# Patient Record
(demographics unavailable — no encounter records)

---

## 2024-10-17 NOTE — ASSESSMENT
[FreeTextEntry1] : Patient is a 26 y/o F w/ PMHx of intractable headaches who came here to establish care.  1) Health Maintenance - deferred flu shot for now - will review vaccine records next visit - needs referral for cervical cancer screening (referred to GYN) - needs referral for breast cancer screening (referred to imaging via [facility])  2) Intractable Headaches - advised to keep headache diary to identify triggers - continue taking Tylenol and Excedrin prn - ordered CTH - referred to Neurology  3) Abdominal Pain 4) Constipation 5) Menstrual Cramps - this could be due to constipation and/or dysmenorrhea/pre-menstrual syndrome - ordered US Abd/Pelvis - ordered dulcolax - will refer to GI if pain persists or worsens  5) Enlarged Lymph Node 6) Breast Mass - palpable, non-tender, non-mobile LN on the L posterior cervical and the R axillary area - palpable breast mass on the R axillary area ([place]) - referred to the [facility] for further imaging and management  7) Sebaceous Cyst - fluctuant mass on the L axilla - referred to Surgery - reassurance  Note: Patient was previously seen by the JOAN last 8/2024. Patient was a bit anxious with regards to her current health conditions. She cried in front of me and appreciated the healthcare that we are providing. Would continue to follow-up on this patient and support her the best that we can.  - Advised proper diet and exercise - follow-up in 4 weeks  Total time spent caring for the patient today was 45 minutes. This includes time spent before the visit reviewing the chart, time spent during visit, and time spent after the visit on documentation, etc.

## 2024-10-17 NOTE — HISTORY OF PRESENT ILLNESS
[FreeTextEntry8] : Patient is a 26 y/o F w/ no significant PMHx who came here due to episodes of generalized headaches. She has intermittent episodes occurring 5-6 months per month. She takes extra-strength Tylenol with no relief. She takes Excedrin which affords temporary relief. She also reports periumbilical pain every month related to her menses. She denies any dizziness, blurring of vision, nausea, vomiting, cough, fever, chest pain, shortness of breath, palpitation, heartburn, diarrhea, dysuria, hematuria, back pain, joint pain, weight loss, loss of appetite. She is concern that her father has a head cancer from the stomach. She was previously seen at the [facility] with labs , AST/ALT 70/49. She was seen at [facility] and was prescribed an unrecalled medication. She tried taking Ibuprofen which only helps with her menstrual cramps.

## 2024-10-17 NOTE — REVIEW OF SYSTEMS
[Abdominal Pain] : abdominal pain [Constipation] : constipation [Headache] : headache [Negative] : Heme/Lymph

## 2024-10-17 NOTE — PHYSICAL EXAM
[Well Developed] : well developed [Well Nourished] : well nourished [Conjunctiva] : the conjunctiva were normal in both eyes [PERRL] : pupils were equal in size, round, and reactive to light [EOM Intact] : extraocular movements were intact [Normal Appearance] : was normal in appearance [Neck Supple] : was supple [Enlarged Diffusely] : was not enlarged [Rate ___] : at [unfilled] breaths per minute [Normal Rhythm/Effort] : normal respiratory rhythm and effort [Clear Bilaterally] : the lungs were clear to auscultation bilaterally [Normal to Percussion] : the lungs were normal to percussion [Normal Rate] : normal [Heart Rate ___] : [unfilled] bpm [Normal S1] : normal S1 [Normal S2] : normal S2 [S3] : no S3 [S4] : no S4 [No Murmur] : no murmurs heard [No Pitting Edema] : no pitting edema present [Right Carotid Bruit] : no bruit heard over the right carotid [Left Carotid Bruit] : no bruit heard over the left carotid [Right Femoral Bruit] : no bruit heard over the right femoral artery [Left Femoral Bruit] : no bruit heard over the left femoral artery [2+] : left 2+ [No Abnormalities] : the abdominal aorta was not enlarged and no bruit was heard [Examination Of The Breasts] : a normal appearance [___cm] : a ~M [unfilled] ~Ucm superior lateral quadrant mass was palpated [Soft, Nontender] : the abdomen was soft and nontender [Diffuse] : there was tenderness diffusely on palpation [No Mass] : no masses were palpated [No HSM] : no hepatosplenomegaly noted [Postauricular Lymph Nodes Enlarged On The Left] : enlarged node(s) on the left [Axillary Lymph Nodes Enlarged On The Right] : enlarged node(s) on the right [Normal Kyphosis] : normal kyphosis [No Visual Abnormalities] : no visible abnormalities [Normal Lordosis] : normal lordosis [No Scoliosis] : no scoliosis [No Tenderness to Palpation] : no spine tenderness on palpation [No Masses] : no masses [Full ROM] : full ROM [No Pain with ROM] : no pain with motion in any direction [Intact] : all reflexes within normal limits bilaterally [Normal Station and Gait] : the gait and station were normal [Normal Motor Tone] : the muscle tone was normal [Involuntary Movements] : no involuntary movements were seen [Abnormal Color] : normal color and pigmentation [Skin Lesions 1] : no skin lesions were observed [Skin Turgor Decreased] : normal skin turgor [Normal] : the deep tendon reflexes were normal [Normal Mental Status] : the patient's orientation, memory, attention, language and fund of knowledge were normal [Appropriate] : appropriate [Impaired judgment] : intact judgment [Impaired Insight] : intact insight [de-identified] : (+) tongue normal, (+) good dentition [de-identified] : (+) palpable 1x1 cm mass/ LN at the superior-lateral side of the R breast around the axillary area [de-identified] : c/o GYN [de-identified] : (+) 3x3 cm fluctuant mass on the left axilla

## 2024-10-24 NOTE — PLAN
[FreeTextEntry1] : all of the options risks and benefits to excision of ectopic breast tissue to L axilla pt wants to take some time to think about it and will call back if she wants to proceed w the planned procedure   RTO PRN Patient's questions and concerns addressed to their satisfaction, and patient verbalized an understanding of the information discussed.

## 2024-10-24 NOTE — HISTORY OF PRESENT ILLNESS
[de-identified] : Ms. VENTURA is a 27 year y/o F referred by PCP, Dr. Freeman- for evaluation of lump that she feels to axillary region, to both sides. Pt states that she's felt the lumpy areas for about a few years.  She had BL BREAST US done 10/23/24- no suspicious findings, accessory glandular tissue to palpable region L axilla. BIRADS 2.

## 2024-10-24 NOTE — CONSULT LETTER
[Dear  ___] : Dear  [unfilled], [Consult Letter:] : I had the pleasure of evaluating your patient, [unfilled]. [Consult Closing:] : Thank you very much for allowing me to participate in the care of this patient.  If you have any questions, please do not hesitate to contact me. [Sincerely,] : Sincerely, [FreeTextEntry3] : Toñito Gtz MD General Surgery

## 2024-10-24 NOTE — PHYSICAL EXAM
[Chaperone Present] : A chaperone was present in the examining room during all aspects of the physical examination [[id number]] : A chaperone was present during the pelvic exam. [FreeTextEntry2] : MALCOLM Willett [Appropriately responsive] : appropriately responsive [Soft] : soft [Non-tender] : non-tender [Non-distended] : non-distended [Oriented x3] : oriented x3 [Examination Of The Breasts] : a normal appearance [No Masses] : no breast masses were palpable [Labia Majora] : normal [Labia Minora] : normal [Polyp ___ cm] : [unfilled] ~[facility] polyp [Normal] : normal [Uterine Adnexae] : normal [FreeTextEntry5] : polyp removed in office with good hemostasis

## 2024-10-24 NOTE — ASSESSMENT
[FreeTextEntry1] : IMP: 28 yo F with ectopic breast tissue L axilla; no suspicious masses felt to either breast. No axillary lymphadenopathy.

## 2024-10-24 NOTE — PHYSICAL EXAM
[No Rash or Lesion] : No rash or lesion [Alert] : alert [Oriented to Person] : oriented to person [Oriented to Place] : oriented to place [Oriented to Time] : oriented to time [Calm] : calm [de-identified] : A/Ox3; NAD. appears comfortable [de-identified] : EOMI; sclera anicteric. [de-identified] : no cervical lymphadenopathy  [de-identified] : airway patent, no use of accessory muscles [de-identified] : prominent axillary tail, R breast ;  ectopic breast tissue L axilla, dense breast tissue. no palpable masses felt to either breast, no axillary lymphadenopathy  [de-identified] : abd is soft, NT/ND

## 2024-10-24 NOTE — PHYSICAL EXAM
[No Rash or Lesion] : No rash or lesion [Alert] : alert [Oriented to Person] : oriented to person [Oriented to Place] : oriented to place [Oriented to Time] : oriented to time [Calm] : calm [de-identified] : A/Ox3; NAD. appears comfortable [de-identified] : EOMI; sclera anicteric. [de-identified] : no cervical lymphadenopathy  [de-identified] : airway patent, no use of accessory muscles [de-identified] : prominent axillary tail, R breast ;  ectopic breast tissue L axilla, dense breast tissue. no palpable masses felt to either breast, no axillary lymphadenopathy  [de-identified] : abd is soft, NT/ND

## 2024-10-24 NOTE — COUNSELING
[Breast Self Exam] : breast self exam [STD (testing, results, tx)] : STD (testing, results, tx) [Other ___] : [unfilled]

## 2024-10-24 NOTE — ASSESSMENT
[FreeTextEntry1] : IMP: 26 yo F with ectopic breast tissue L axilla; no suspicious masses felt to either breast. No axillary lymphadenopathy.

## 2024-10-24 NOTE — HISTORY OF PRESENT ILLNESS
[de-identified] : Ms. VENTURA is a 27 year y/o F referred by PCP, Dr. Freeman- for evaluation of lump that she feels to axillary region, to both sides. Pt states that she's felt the lumpy areas for about a few years.  She had BL BREAST US done 10/23/24- no suspicious findings, accessory glandular tissue to palpable region L axilla. BIRADS 2.

## 2024-10-24 NOTE — PHYSICAL EXAM
[No Rash or Lesion] : No rash or lesion [Alert] : alert [Oriented to Person] : oriented to person [Oriented to Place] : oriented to place [Oriented to Time] : oriented to time [Calm] : calm [de-identified] : A/Ox3; NAD. appears comfortable [de-identified] : EOMI; sclera anicteric. [de-identified] : no cervical lymphadenopathy  [de-identified] : airway patent, no use of accessory muscles [de-identified] : prominent axillary tail, R breast ;  ectopic breast tissue L axilla, dense breast tissue. no palpable masses felt to either breast, no axillary lymphadenopathy  [de-identified] : abd is soft, NT/ND

## 2024-10-24 NOTE — HISTORY OF PRESENT ILLNESS
[N] : Patient does not use contraception [Y] : Positive pregnancy history [PapSmeardate] : unknown [LMPDate] : 10/19/24 [PGxTotal] : 1 [PGHxFullTerm] : 0 [PGHxPremature] : 0 [PGHxAbortions] : 1 [[facility] CampusxLiving] : 0 [Localized] : localized [TextBox_7] : lower pelvic pain typically before and during menstrual cycles, sometimes after [Normal Amount/Duration] :  normal amount and duration [Regular Cycle Intervals] : periods have been regular [FreeTextEntry1] : 10/19/24 [Currently Active] : currently active [Women] : women [No] : No [Patient would like to be screened for STIs] : Patient would like to be screened for STIs

## 2024-10-24 NOTE — HISTORY OF PRESENT ILLNESS
[de-identified] : Ms. VENTURA is a 27 year y/o F referred by PCP, Dr. Freeman- for evaluation of lump that she feels to axillary region, to both sides. Pt states that she's felt the lumpy areas for about a few years.  She had BL BREAST US done 10/23/24- no suspicious findings, accessory glandular tissue to palpable region L axilla. BIRADS 2.

## 2024-10-29 NOTE — PHYSICAL EXAM
[JVD] : no jugular venous distention  [Normal Breath Sounds] : Normal breath sounds [Normal Rate and Rhythm] : normal rate and rhythm [2+] : left 2+ [Abdominal Masses] : No abdominal masses [Abdomen Tenderness] : ~T ~M No abdominal tenderness [Skin Ulcer] : no ulcer [Alert] : alert [Oriented to Person] : oriented to person [Oriented to Place] : oriented to place [Oriented to Time] : oriented to time [Calm] : calm [de-identified] : nad [de-identified] : daphne [de-identified] : normal [de-identified] : scar suprapubic and rlq from ovary and appendix no hernia has vague pain at umbilicus no hernia [de-identified] : nl [de-identified] : rom nl

## 2024-10-29 NOTE — HISTORY OF PRESENT ILLNESS
[de-identified] : intermittent pain, no diarhea, tends to have constipationocationneed4 severity no fevers, no vomiting timing/duration 8 months quality no tarry, or bleed in stools, yellow brown  other- went to see GYN, has history or torsion right ovary and a surgery for it, as well as an open appendectomy in [facility] in [place] ago no pregnancies, nl menses

## 2024-10-29 NOTE — ASSESSMENT
[FreeTextEntry1] :   27 yr old with vague abdominal pain, from [employer], has had 2 abdominal surgeries appy and ovary, unclear whether had ovary removed, no rebound no vaginal issues currently urine and labs normal, not anemic complexity- mod, has constipation without her senna tea consider obstruction from adhesions no history endometriosis, however, she does not have [facility] consider full ct abdomen and pelvis to determine whether there is intestinal obstruction risk low-mod  time45

## 2024-10-29 NOTE — PHYSICAL EXAM
[JVD] : no jugular venous distention  [Normal Breath Sounds] : Normal breath sounds [Normal Rate and Rhythm] : normal rate and rhythm [2+] : left 2+ [Abdominal Masses] : No abdominal masses [Abdomen Tenderness] : ~T ~M No abdominal tenderness [Skin Ulcer] : no ulcer [Alert] : alert [Oriented to Person] : oriented to person [Oriented to Place] : oriented to place [Oriented to Time] : oriented to time [Calm] : calm [de-identified] : nad [de-identified] : daphne [de-identified] : normal [de-identified] : scar suprapubic and rlq from ovary and appendix no hernia has vague pain at umbilicus no hernia [de-identified] : nl [de-identified] : rom nl

## 2024-10-29 NOTE — ASSESSMENT
[FreeTextEntry1] :   27 yr old with vague abdominal pain, from [employer], has had 2 abdominal surgeries appy and ovary, unclear whether had ovary removed, no rebound no vaginal issues currently urine and labs normal, not anemic complexity- mod, has constipation without her senna tea consider obstruction from adhesions no history endometriosis, however, she does not have path [facility] consider full ct abdomen and pelvis to determine whether there is intestinal obstruction risk low-mod  time45

## 2024-10-29 NOTE — HISTORY OF PRESENT ILLNESS
[de-identified] : intermittent pain, no diarhea, tends to have constipationocationneed4 severity no fevers, no vomiting timing/duration 8 months quality no tarry, or bleed in stools, yellow brown  other- went to see GYN, has history or torsion right ovary and a surgery for it, as well as an open appendectomy in [facility] in [place] years ago no pregnancies, nl menses

## 2024-11-14 NOTE — HISTORY OF PRESENT ILLNESS
[FreeTextEntry1] : follow-up [de-identified] : Patient is a 29 y/o F w/ PMHx of HLD, Vit. D deficiency, endometrial polyp, uterine fibroids. She still complains of frontal headache radiating to the back with associated dizziness and nausea. She kept a headache diary. At times, the headaches are so severe that it affects her work and driving. She took Tylenol with no relief. She took 2 tabs Excedrin which provided temporary relief, and she feels hyped with it. She denies any dizziness, nausea, vomiting, cough, fever, chest pain, shortness of breath, palpitation, heartburn, abdominal pain, diarrhea, constipation, dysuria, hematuria, back pain, joint pain, weight loss, loss of appetite.

## 2024-11-14 NOTE — ASSESSMENT
[FreeTextEntry1] : Patient is a 27 y/o F w/ PMHx of HLD, Vit. D deficiency, endometrial polyp, uterine fibroids. She came here for follow-up.  1) Migraine Headaches - intractable and not responding to tylenol, NSAIDs - responds temporarily to Excedrin (advised not to take more than 1 tab due to caffeine) - start sumatriptan 50 mg prn - CTH rejected by insurance (will order MR Brain) - scheduled with Neurology (Dr. Harris) - sent a message to have her seen earlier - advised to go to the ED if headaches are severe and persistent  2) Heavy Menstrual bleeding - CT and US Abd/Pelvis showed fibroid uterus and polyps - reports heavy menstrual bleeding with occassional blood clots - f/u w/ GYN  3) HLD - LDL - 122 - advised low fat diet  4) Vit. D deficiency - Vit. D - 19.2 - continue weekly Vit. D  Total time spent caring for the patient today was 30 minutes. This includes time spent before the visit reviewing the chart, time spent during visit, and time spent after the visit on documentation, etc.

## 2024-12-03 NOTE — HISTORY OF PRESENT ILLNESS
[FreeTextEntry1] : follow up appt to discuss pelvic US results Pt reports extreme pain and very heavy flow with 1st 3 days of menses US 10/31 showed AV fibroid uterus as well as an Ovoid echogenic polyp, EM 12mm  Other results show Pap NILM, neg STD testing Tissue biopsy confirms cervical polypectomy which was removed at initial appt  Pt inquired about [place] We discussed the potential for infertility Pt identifies as damon and does not believe she wants to carry a child in the future  We discussed the potential use of Mirena IUD as wel as other hormonal methods as treatment for fibroids and menorrhagia, pt is fearful of IUD and hormone use

## 2024-12-03 NOTE — PLAN
[FreeTextEntry1] : advised to return to office for a consult with Dr Huang for surgical consult of hysteroscopic polypectomy and  possibly fibroid options

## 2024-12-05 NOTE — HISTORY OF PRESENT ILLNESS
[FreeTextEntry1] : 29yo here to discuss surgical mgmt of heavy periods/fibroids/endometrial polyp.  States periods used to require 3 pads on heavy days, but now since a few months ago, soaking through pads and needs to change 5-6 times. Never required a blood transfusion, or required hospitalization for heavy bleeding. Accepts blood products.  States has also been having headaches that are associated w visual blurriness - sometimes prodromal, sometimes occurs during HA. Started 1 year ago, 2-4 times per week, not associated w activity or time of day. Used to take tylenol which helped but now excedrin helps.  Also c/o abd pain w periods, 12/10 pain, has periumbilical pain, sometimes right before or during periods, resolves w resolution of menses/ ibuprofen q6h - helps w crampy pain and pain that is in right leg; does not help w periumbilical pain  PMH: denies PSH: open appendectomy [facility] 2012, appendicitis twisted ov cyst 2015 or 2014 - pain - open surgery, unsure if ovary was removed in [place] unsure if wants to have children, scared to get pregnant and her girlfriend also does not want children

## 2024-12-05 NOTE — HISTORY OF PRESENT ILLNESS
[FreeTextEntry1] : 27yo here to discuss surgical mgmt of heavy periods/fibroids/endometrial polyp.  States periods used to require 3 pads on heavy days, but now since a few months ago, soaking through pads and needs to change 5-6 times. Never required a blood transfusion, or required hospitalization for heavy bleeding. Accepts blood products.  States has also been having headaches that are associated w visual blurriness - sometimes prodromal, sometimes occurs during HA. Started 1 year ago, 2-4 times per week, not associated w activity or time of day. Used to take tylenol which helped but now excedrin helps.  Also c/o abd pain w periods, 12/10 pain, has periumbilical pain, sometimes right before or during periods, resolves w resolution of menses/ ibuprofen q6h - helps w crampy pain and pain that is in right leg; does not help w periumbilical pain  PMH: denies PSH: open appendectomy [facility] 2012, appendicitis twisted ov cyst 2015 or 2014 - pain - open surgery, unsure if ovary was removed in [place] unsure if wants to have children, scared to get pregnant and her girlfriend also does not want children

## 2024-12-05 NOTE — PLAN
[FreeTextEntry1] : Reviewed US results, showing endometrial polyp - possibly cause of heavy bleeding. Discussed D&C hysteroscopy and the r/b/a. Discussed how this is not likely to help w the pain she is experiencing but may or may not resolve the heavy bleeding.  Discussed dysmenorrhea and recommendation to try medical therapy. At this point, because of the potential migraines w aura, I would not start her on estrogen unless neurologic eval reveals HA due to other cause. Would recommend trying progesterone which includes oral pills, injection, implant and IUD. PT states she does not want anything in her body, states she is fearful of side effects namely weight gain. I would not recommend depo for her. We briefly discussed surgical options as well, although I would not recommend as first line interventions.  Pt states she will discuss these options w her partner and will call if she would like to proceed w D&C hysteroscopy or POPs.

## 2024-12-11 NOTE — HISTORY OF PRESENT ILLNESS
[FreeTextEntry1] : The patient is here for headaches which started a few months ago.  The headaches are described as pressure as well as sharp pain, associated with nausea and vomiting.  The patient's headache occurs several times per month and lasts for few minutes, up to an hour, and that she takes the medication, they resolved with Excedrin.  There is no photophobia or phonophobia with the headaches.  She has occasional dizziness, not always associated with the headaches.  The headaches occur 10-20 times per month. The patient is sexually active, she does not use contraception, she is damon. No plans on pregnancy at this time.

## 2024-12-11 NOTE — CONSULT LETTER
[Dear  ___] : Dear  [unfilled], [Consult Letter:] : I had the pleasure of evaluating your patient, [unfilled]. [Please see my note below.] : Please see my note below. [Consult Closing:] : Thank you very much for allowing me to participate in the care of this patient.  If you have any questions, please do not hesitate to contact me. [Sincerely,] : Sincerely, [FreeTextEntry3] : Pricila Harris MD, MPH

## 2024-12-11 NOTE — ASSESSMENT
[FreeTextEntry1] : The patient is having headaches, with tension and sharp types of pain with some associated migrainous features, without meeting criteria for migraines.  This headaches in an increased frequency and sometimes wake up patient from sleep.  Given this, we will plan to obtain MRI of the brain to rule out secondary causes of headaches.  For headache prevention, will start riboflavin 400 mg, magnesium 400 mg and coenzyme Q 200 mg daily.  For headache rescue, the patient can continue with Excedrin for headache at onset of headache and repeat in 2 hours if headache does not resolve, maximum 3 times per day.  The patient was previously given sumatriptan, she could continue the sumatriptan if her headaches do not improve with Excedrin.  Additionally, will obtain an EKG, to evaluate QTc interval in case there is need to start tricyclic antidepressant. No plans for pregnancy at this point.  The patient does not use contraception, she is same-sex relationship/s..  I spent the time noted on the day of this patient encounter preparing for, providing and documenting the above E/M service and counseling and educate patient on differential, workup, disease course, and treatment/management. Education was provided to the patient during this encounter. All questions and concerns were answered and addressed in detail. The patient verbalized understanding and agreed to plan. Patient was advised to continue to monitor for neurologic symptoms and to notify my office or go to the nearest emergency room if there are any changes. Any orders/referrals and communications were provided as well.   Side effects of the above medications were discussed in detail including but not limited to applicable black box warning and teratogenicity as appropriate.  For patients with seizures, I discussed risk of SUDEP with patient and advised that SUDEP risk can be minimized with good seizure control through medication compliance and other measures. Patient was advised to bring previous records to my office, including CD of imaging, when applicable.

## 2024-12-26 NOTE — HISTORY OF PRESENT ILLNESS
[FreeTextEntry1] : follow-up [de-identified] : Patient is a 29 y/o F w/ PMHx of HLD, Vit. D deficiency, endometrial polyp, uterine fibroids. She still complains of frontal headache radiating to the back with associated dizziness. She recently seen Neuro (Dr. Harris). MR of the brain showed 1.8 x 1.0 cm extra-axial low T2 signal demonstrating gradient susceptibility in the right frontoparietal region near the vertex noted which may be related to calcification. She also complains bilateral foot pain. She used to take Naproxen with no relief. She is currently receiving monthly injections which only affords temporary relief. She also reports constipation and only has BM w/ Dulcolax w/ associated dry skin, cold intolerance. She denies any headache, dizziness, nausea, vomiting, cough, fever, chest pain, shortness of breath, palpitation, heartburn, abdominal pain, diarrhea, dysuria, hematuria, back pain, joint pain, weight loss, loss of appetite

## 2024-12-26 NOTE — REVIEW OF SYSTEMS
[Constipation] : constipation [Joint Pain] : joint pain [Joint Stiffness] : joint stiffness [Headache] : headache [Dizziness] : dizziness [Negative] : Heme/Lymph [Abdominal Pain] : no abdominal pain [Nausea] : no nausea [Diarrhea] : diarrhea [Vomiting] : no vomiting [Heartburn] : no heartburn [Melena] : no melena [Fainting] : no fainting [Confusion] : no confusion [Memory Loss] : no memory loss [Unsteady Walking] : no ataxia

## 2025-01-08 NOTE — HISTORY OF PRESENT ILLNESS
[FreeTextEntry1] : Patient is a 28 year old female who presents to clinic for a second opinion regarding b/l heel pain. Patient states that she has had heel pain for the past year or so, starting in her right foot and progressing to involve both her heels. Patient states that the heel pain is around the plantar aspects and is not centered in any specific location. Patient states that she does not have the most pain first thing in the morning but rather develops more pain the longer she remains active. Patient states that she has previously gone to see another podiatrist who believed it to be plantar fasciitis and has been treating it as such. They have given her pre-mauro orthotics, recommended new shoewear, exercises, and given a total of 3 injections but it has not helped much. Patient states that pain relief only lasts for one week from the injections before coming back. Patient denies any other pedal complaints. Patient denies any constitutional symptoms at this time.

## 2025-01-08 NOTE — PROCEDURE
[FreeTextEntry1] : Physical Exam: Vasc: DP/PT 2/4 b/l. CFT <3 x 10. Temperature gradient is warm to cool and digits are well perfused. No edema noted. Neuro: Sensation grossly intact and tested via [place] 4/4 b/l. Derm: Skin is supple and well hydrated. No hyperkeratotic lesions noted. No open wounds or signs of skin or soft tissue infection.  MSK: Pain on palpation of bilateral heels, palpation along inferior calcaneal nerve. No Tinel or Valoix sign noted. Arch height 1/5 NWB. Patient is able to perform single and double heel raise without pain. Muscle strength is 5/5 in all muscle compartments.   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 X-rays reviewed; no fractures or dislocations noted. No bony pathology noted. Discussed diagnosis and treatment options with patient. Recommended diagnostic block of tarsal tunnel to patient; recommended patient not have to work afterwards so patient will be returning on her day off. Ordered MRI of the left ankle without contrast to evaluate for impingement of the tarsal tunnel. Ordered EMG/NCV to evaluate for impingement of tarsal tunnel. Answered all questions and concerns to patient's satisfaction. Patient to return Thursday 1/9.

## 2025-01-16 NOTE — END OF VISIT
[] : Resident [FreeTextEntry3] : pt seen and treated  with resident . All care directed and discussed with DR Bahena

## 2025-01-16 NOTE — PROCEDURE
[FreeTextEntry1] : Physical Exam: Vasc: DP/PT 2/4 b/l. CFT <3 x 10. Temperature gradient is warm to cool and digits are well perfused. No edema noted. Neuro: Sensation grossly intact and tested via [place] 4/4 b/l. Derm: Skin is supple and well hydrated. No hyperkeratotic lesions noted. No open wounds or signs of skin or soft tissue infection.  MSK: Pain on palpation of bilateral heels, palpation along inferior calcaneal nerve. No Tinel or Valoix sign noted. Arch height 1/5 NWB. Patient is able to perform single and double heel raise without pain. Muscle strength is 5/5 in all muscle compartments.   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 X-rays reviewed; no fractures or dislocations noted. No bony pathology noted. Discussed diagnosis and treatment options with patient. Recommended diagnostic block of tarsal tunnel to patient; written consent obtained. Diagnostic block given to the right foot tarsal tunnel using 2cc of 1% lidocaine and 1cc of dexamethasone. Patient tolerated procedure well and without incident. Patient instructed to monitor therapeutic effect of block over the next few days. MRI of the left ankle without contrast to evaluate for impingement of the tarsal tunnel pending. EMG/NCV to evaluate for impingement of tarsal tunnel; patient will have to see neurology for procedure. Answered all questions and concerns to patient's satisfaction. Patient to return in 2 weeks  Written by Ishan Hook PGY1

## 2025-01-16 NOTE — PROCEDURE
[FreeTextEntry1] : Physical Exam: Vasc: DP/PT 2/4 b/l. CFT <3 x 10. Temperature gradient is warm to cool and digits are well perfused. No edema noted. Neuro: Sensation grossly intact and tested via [place] 4/4 b/l. Derm: Skin is supple and well hydrated. No hyperkeratotic lesions noted. No open wounds or signs of skin or soft tissue infection.  MSK: Pain on palpation of bilateral heels, palpation along inferior calcaneal nerve. No Tinel or Valoix sign noted. Arch height 1/5 NWB. Patient is able to perform single and double heel raise without pain. Muscle strength is 5/5 in all muscle compartments.   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 X-rays reviewed; no fractures or dislocations noted. No bony pathology noted. Discussed diagnosis and treatment options with patient. Recommended diagnostic block of tarsal tunnel to patient; written consent obtained. Diagnostic block given to the right foot tarsal tunnel using 2cc of 1% lidocaine and 1cc of dexamethasone. Patient tolerated procedure well and without incident. Patient instructed to monitor therapeutic effect of block over the next few days. MRI of the left ankle without contrast to evaluate for impingement of the tarsal tunnel pending. EMG/NCV to evaluate for impingement of tarsal tunnel; patient will have to see neurology for procedure. Answered all questions and concerns to patient's satisfaction. Patient to return in 2 weeks  Written by Ishan Hook PGY1

## 2025-01-23 NOTE — PHYSICAL EXAM
[General Appearance - Alert] : alert [General Appearance - In No Acute Distress] : in no acute distress [Oriented To Time, Place, And Person] : oriented to person, place, and time [Cranial Nerves Optic (II)] : visual acuity intact bilaterally,  pupils equal round and reactive to light [Cranial Nerves Oculomotor (III)] : extraocular motion intact [Cranial Nerves Trigeminal (V)] : facial sensation intact symmetrically [Cranial Nerves Facial (VII)] : face symmetrical [Cranial Nerves Vestibulocochlear (VIII)] : hearing was intact bilaterally [Cranial Nerves Accessory (XI - Cranial And Spinal)] : head turning and shoulder shrug symmetric [Motor Tone] : muscle tone was normal in all four extremities [Cranial Nerves Hypoglossal (XII)] : there was no tongue deviation with protrusion [Motor Strength] : muscle strength was normal in all four extremities [Involuntary Movements] : no involuntary movements were seen [Sensation Tactile Decrease] : light touch was intact [Balance] : balance was intact [Sclera] : the sclera and conjunctiva were normal [PERRL With Normal Accommodation] : pupils were equal in size, round, reactive to light, with normal accommodation [Extraocular Movements] : extraocular movements were intact [Hearing Threshold Finger Rub Not [place]] : hearing was normal [Neck Appearance] : the appearance of the neck was normal [] : no respiratory distress [Exaggerated Use Of Accessory Muscles For Inspiration] : no accessory muscle use [Abnormal Walk] : normal gait [Skin Color & Pigmentation] : normal skin color and pigmentation

## 2025-01-23 NOTE — PHYSICAL EXAM
[General Appearance - Alert] : alert [General Appearance - In No Acute Distress] : in no acute distress [Oriented To Time, Place, And Person] : oriented to person, place, and time [Cranial Nerves Optic (II)] : visual acuity intact bilaterally,  pupils equal round and reactive to light [Cranial Nerves Oculomotor (III)] : extraocular motion intact [Cranial Nerves Trigeminal (V)] : facial sensation intact symmetrically [Cranial Nerves Facial (VII)] : face symmetrical [Cranial Nerves Vestibulocochlear (VIII)] : hearing was intact bilaterally [Cranial Nerves Accessory (XI - Cranial And Spinal)] : head turning and shoulder shrug symmetric [Motor Tone] : muscle tone was normal in all four extremities [Cranial Nerves Hypoglossal (XII)] : there was no tongue deviation with protrusion [Motor Strength] : muscle strength was normal in all four extremities [Involuntary Movements] : no involuntary movements were seen [Sensation Tactile Decrease] : light touch was intact [Balance] : balance was intact [Sclera] : the sclera and conjunctiva were normal [PERRL With Normal Accommodation] : pupils were equal in size, round, reactive to light, with normal accommodation [Extraocular Movements] : extraocular movements were intact [Hearing Threshold Finger Rub Not [place]] : hearing was normal [Neck Appearance] : the appearance of the neck was normal [] : no respiratory distress [Exaggerated Use Of Accessory Muscles For Inspiration] : no accessory muscle use [Abnormal Walk] : normal gait [Skin Color & Pigmentation] : normal skin color and pigmentation

## 2025-01-23 NOTE — PHYSICAL EXAM
[General Appearance - Alert] : alert [General Appearance - In No Acute Distress] : in no acute distress [Oriented To Time, Place, And Person] : oriented to person, place, and time [Cranial Nerves Optic (II)] : visual acuity intact bilaterally,  pupils equal round and reactive to light [Cranial Nerves Oculomotor (III)] : extraocular motion intact [Cranial Nerves Trigeminal (V)] : facial sensation intact symmetrically [Cranial Nerves Facial (VII)] : face symmetrical [Cranial Nerves Vestibulocochlear (VIII)] : hearing was intact bilaterally [Cranial Nerves Accessory (XI - Cranial And Spinal)] : head turning and shoulder shrug symmetric [Cranial Nerves Hypoglossal (XII)] : there was no tongue deviation with protrusion [Motor Tone] : muscle tone was normal in all four extremities [Motor Strength] : muscle strength was normal in all four extremities [Involuntary Movements] : no involuntary movements were seen [Sensation Tactile Decrease] : light touch was intact [Balance] : balance was intact [Sclera] : the sclera and conjunctiva were normal [PERRL With Normal Accommodation] : pupils were equal in size, round, reactive to light, with normal accommodation [Extraocular Movements] : extraocular movements were intact [Hearing Threshold Finger Rub Not [place]] : hearing was normal [Neck Appearance] : the appearance of the neck was normal [] : no respiratory distress [Exaggerated Use Of Accessory Muscles For Inspiration] : no accessory muscle use [Abnormal Walk] : normal gait [Skin Color & Pigmentation] : normal skin color and pigmentation

## 2025-01-23 NOTE — PHYSICAL EXAM
[General Appearance - Alert] : alert [General Appearance - In No Acute Distress] : in no acute distress [Oriented To Time, Place, And Person] : oriented to person, place, and time [Cranial Nerves Optic (II)] : visual acuity intact bilaterally,  pupils equal round and reactive to light [Cranial Nerves Oculomotor (III)] : extraocular motion intact [Cranial Nerves Trigeminal (V)] : facial sensation intact symmetrically [Cranial Nerves Facial (VII)] : face symmetrical [Cranial Nerves Vestibulocochlear (VIII)] : hearing was intact bilaterally [Cranial Nerves Accessory (XI - Cranial And Spinal)] : head turning and shoulder shrug symmetric [Motor Tone] : muscle tone was normal in all four extremities [Cranial Nerves Hypoglossal (XII)] : there was no tongue deviation with protrusion [Motor Strength] : muscle strength was normal in all four extremities [Involuntary Movements] : no involuntary movements were seen [Sensation Tactile Decrease] : light touch was intact [Balance] : balance was intact [Sclera] : the sclera and conjunctiva were normal [PERRL With Normal Accommodation] : pupils were equal in size, round, reactive to light, with normal accommodation [Extraocular Movements] : extraocular movements were intact [Hearing Threshold Finger Rub Not [place]] : hearing was normal [Neck Appearance] : the appearance of the neck was normal [] : no respiratory distress [Exaggerated Use Of Accessory Muscles For Inspiration] : no accessory muscle use [Abnormal Walk] : normal gait [Skin Color & Pigmentation] : normal skin color and pigmentation

## 2025-01-28 NOTE — HISTORY OF PRESENT ILLNESS
[FreeTextEntry1] : headache  [de-identified] : 28-year-old female with history of headaches x few months. She was seen by neurology Dr. Harris on 12/11/24. Per chart review, headaches are described as pressure as well as sharp pain, associated with nausea and vomiting. The patient's headache occurs several times per month and lasts for few minutes, up to an hour, and that she takes the medication, they resolved with Excedrin. There is no photophobia or phonophobia with the headaches. She has occasional dizziness, not always associated with the headaches. She was started on medications for headache prevention and MRI of the brain w/wo was done on 12/20/24. She continues with daily headaches, intermittent blurry vision, difficulty with focusing/memory, intermittent nausea or vomiting when the headaches are intense. No weakness, numbness, bladder or bowel dysfunction.

## 2025-01-28 NOTE — ASSESSMENT
[FreeTextEntry1] : 27 y/o F with history of headaches managed by neurology. MRI brain without contrast on 12/20/24 with incidental nonspecific 1.8 x 1.0 cm extra-axial low T2 signal demonstrating gradient susceptibility in the right frontoparietal region near the vertex noted which may be related to calcification. Will obtain CT head w/o contrast for further evaluation.

## 2025-01-28 NOTE — HISTORY OF PRESENT ILLNESS
[FreeTextEntry1] : headache  [de-identified] : 28-year-old female with history of headaches x few months. She was seen by neurology Dr. Harris on 12/11/24. Per chart review, headaches are described as pressure as well as sharp pain, associated with nausea and vomiting. The patient's headache occurs several times per month and lasts for few minutes, up to an hour, and that she takes the medication, they resolved with Excedrin. There is no photophobia or phonophobia with the headaches. She has occasional dizziness, not always associated with the headaches. She was started on medications for headache prevention and MRI of the brain w/wo was done on 12/20/24. She continues with daily headaches, intermittent blurry vision, difficulty with focusing/memory, intermittent nausea or vomiting when the headaches are intense. No weakness, numbness, bladder or bowel dysfunction.

## 2025-01-28 NOTE — ASSESSMENT
[FreeTextEntry1] : 29 y/o F with history of headaches managed by neurology. MRI brain without contrast on 12/20/24 with incidental nonspecific 1.8 x 1.0 cm extra-axial low T2 signal demonstrating gradient susceptibility in the right frontoparietal region near the vertex noted which may be related to calcification. Will obtain CT head w/o contrast for further evaluation.

## 2025-01-28 NOTE — HISTORY OF PRESENT ILLNESS
[FreeTextEntry1] : headache  [de-identified] : 28-year-old female with history of headaches x few months. She was seen by neurology Dr. Harris on 12/11/24. Per chart review, headaches are described as pressure as well as sharp pain, associated with nausea and vomiting. The patient's headache occurs several times per month and lasts for few minutes, up to an hour, and that she takes the medication, they resolved with Excedrin. There is no photophobia or phonophobia with the headaches. She has occasional dizziness, not always associated with the headaches. She was started on medications for headache prevention and MRI of the brain w/wo was done on 12/20/24. She continues with daily headaches, intermittent blurry vision, difficulty with focusing/memory, intermittent nausea or vomiting when the headaches are intense. No weakness, numbness, bladder or bowel dysfunction.

## 2025-01-28 NOTE — HISTORY OF PRESENT ILLNESS
[FreeTextEntry1] : headache  [de-identified] : 28-year-old female with history of headaches x few months. She was seen by neurology Dr. Harris on 12/11/24. Per chart review, headaches are described as pressure as well as sharp pain, associated with nausea and vomiting. The patient's headache occurs several times per month and lasts for few minutes, up to an hour, and that she takes the medication, they resolved with Excedrin. There is no photophobia or phonophobia with the headaches. She has occasional dizziness, not always associated with the headaches. She was started on medications for headache prevention and MRI of the brain w/wo was done on 12/20/24. She continues with daily headaches, intermittent blurry vision, difficulty with focusing/memory, intermittent nausea or vomiting when the headaches are intense. No weakness, numbness, bladder or bowel dysfunction.

## 2025-02-04 NOTE — HISTORY OF PRESENT ILLNESS
[FreeTextEntry1] : Patient is a 28 year old female who returns to clinic regarding b/l heel pain. Patient states that she has had heel pain for the past year or so, starting in her right foot and progressing to involve both her heels. Patient states that the heel pain is around the plantar aspects and is not centered in any specific location. Patient states that she does not have the most pain first thing in the morning but rather develops more pain the longer she remains active. Patient states that she has previously gone to see another podiatrist who believed it to be plantar fasciitis and has been treating it as such. They have given her pre-mauro orthotics, recommended new shoewear, exercises, and given a total of 3 injections but it has not helped much. She states that the injection from last visit did not improve her pain. Patient states that pain relief only lasts for one week from the injections before coming back. Patient denies any other pedal complaints. Patient denies any constitutional symptoms at this time.

## 2025-02-04 NOTE — PROCEDURE
[FreeTextEntry1] : Physical Exam: Vasc: DP/PT 2/4 b/l. CFT <3 x 10. Temperature gradient is warm to cool and digits are well perfused. No edema noted. Neuro: Sensation grossly intact and tested via [place] 4/4 b/l. Derm: Skin is supple and well hydrated. No hyperkeratotic lesions noted. No open wounds or signs of skin or soft tissue infection.  MSK: Pain on palpation of bilateral heels, palpation along inferior calcaneal nerve. No Tinel or Valoix sign noted. Arch height 1/5 NWB. Patient is able to perform single and double heel raise without pain. Muscle strength is 5/5 in all muscle compartments.   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 No bony pathology noted. MRI reviewed - no acute findings Discussed diagnosis and treatment options with patient. Rxed Gabapentin  Recommend patient make an appointment with Neurology to get EMG/NCV test Recommend wearing more supportive shoegear Answered all questions and concerns to patient's satisfaction. Patient to return after completing EMG/NCV

## 2025-02-19 NOTE — HISTORY OF PRESENT ILLNESS
[FreeTextEntry1] : The patient is here for headaches which started a few months ago. The headaches are described as pressure as well as sharp pain, associated with nausea and vomiting. The patient's headache occurs several times per month and lasts for few minutes, up to an hour, and that she takes the medication, they resolved with Excedrin. There is no photophobia or phonophobia with the headaches. She has occasional dizziness, not always associated with the headaches.  The headaches occur 10-20 times per month. The patient is sexually active, she does not use contraception, she is damon. No plans on pregnancy at this time.  Started on riboflavin 400 mg, magnesium 400 mg and coenzyme Q 200 mg daily with improvement of headache to 4 headaches per month.   Saw neurosurgery.  Also referred for bl Tarsal tunner eval for bl heel pain, sharp pain, without weakness.

## 2025-02-19 NOTE — PHYSICAL EXAM
[General Appearance - Alert] : alert [General Appearance - In No Acute Distress] : in no acute distress [Motor Tone] : muscle tone was normal in all four extremities [Motor Strength] : muscle strength was normal in all four extremities [Sensation Tactile Decrease] : light touch was intact [Abnormal Walk] : normal gait [Balance] : balance was intact

## 2025-02-19 NOTE — ASSESSMENT
[FreeTextEntry1] : The patient is having headaches, with tension and sharp types of pain with some associated migrainous features, without meeting criteria for migraines. This headaches in an increased frequency and sometimes wake up patient from sleep. Given this, we will plan to obtain MRI of the brain to rule out secondary causes of headaches. For headache prevention, will start riboflavin 400 mg, magnesium 400 mg and coenzyme Q 200 mg daily. For headache rescue, the patient can continue with Excedrin for headache at onset of headache and repeat in 2 hours if headache does not resolve, maximum 3 times per day. The patient was previously given sumatriptan, she could continue the sumatriptan if her headaches do not improve with Excedrin.  *** QTc interval is 398. No plans for pregnancy at this point. The patient does not use contraception, she is same-sex relationship/s..  pain in bl heels, sharp, concern for bl Tarsal tunnel, will get [email or web address] legs for eval  will add Nortriptyline 10mg HS for headaches and sharp pain bl heel as it limits pts lifestyle  I spent the time noted on the day of this patient encounter preparing for, providing and documenting the above E/M service and counseling and educate patient on differential, workup, disease course, and treatment/management. Education was provided to the patient during this encounter. All questions and concerns were answered and addressed in detail. The patient verbalized understanding and agreed to plan. Patient was advised to continue to monitor for neurologic symptoms and to notify my office or go to the nearest emergency room if there are any changes. Any orders/referrals and communications were provided as well.  Side effects of the above medications were discussed in detail including but not limited to applicable black box warning and teratogenicity as appropriate. For patients with seizures, I discussed risk of SUDEP with patient and advised that SUDEP risk can be minimized with good seizure control through medication compliance and other measures. Patient was advised to bring previous records to my office, including CD of imaging, when applicable.

## 2025-02-25 NOTE — HISTORY OF PRESENT ILLNESS
[FreeTextEntry1] : 28-year-old lady history of fibroids, hyperlipidemia, headaches, here for constipation.  can't have bm w/o meds 2-3 d w/o bm Ambida - dulcolax for several years--[id number] before this at leat every 2 days  tea - helps - gives her bad cramps  water - 8-10 botles of water a day veggies - on a diet 6 lb- intentional  both feet hurt - ? plantar fasciitis  twisted ovarian cyst - R side -  or so - [place]  dad had stomach cancer  in his late 50s-early  60s mets to brain not clear primary  no dysphagia, odynophagia no GERD heavier than usual menses last month

## 2025-02-25 NOTE — ASSESSMENT
[FreeTextEntry1] : 28-year-old lady history of fibroids, hyperlipidemia, headaches, severe bilateral plantar fasciitis here for constipation.  The patient reports that her father  of metastatic GI cancer with mets to the brain with an unclear primary in his late 50s.  She is very concerned that the constipation she is currently experiencing might be related to this family history.  She is not anemic, she is intentionally lost a little weight, but she drinks a lot of water and eats a lot of fiber and in spite of this she cannot have bowel movements without laxative.  In terms of upper symptoms, she does note that she gets short of breath if lying down postprandially  which might be related to just having a full stomach. normal... Well appearing, well nourished, awake, alert, oriented to person, place, time/situation and in no apparent distress.

## 2025-02-25 NOTE — ASSESSMENT
[FreeTextEntry1] : 28-year-old lady history of fibroids, hyperlipidemia, headaches, severe bilateral plantar fasciitis here for constipation.  The patient reports that her father  of metastatic GI cancer with mets to the brain with an unclear primary in his late 50s.  She is very concerned that the constipation she is currently experiencing might be related to this family history.  She is not anemic, she is intentionally lost a little weight, but she drinks a lot of water and eats a lot of fiber and in spite of this she cannot have bowel movements without laxative.  In terms of upper symptoms, she does note that she gets short of breath if lying down postprandially  which might be related to just having a full stomach.

## 2025-02-25 NOTE — PLAN
[TextEntry] : 1.  Sitz marker study to evaluate for constipation 2.  EGD and colonoscopy to evaluate for family history of GI neoplasm of unclear primary. The procedure(s) was (were) discussed in detail with the patient, including indications, alternatives and limitations.  The risks and benefits were reviewed.  If applicable, the prep directions were reviewed as well, else the patient was told to fast on the day of the procedure.   3.  Would suggest anorectal manometry if the colonoscopy is nondiagnostic. 4.  I am also concerned that the patient has PTSD (her father was not okay with her sexuality)  and is literally retaining stress in her body in the form of plantar fasciitis and constipation; I discussed this briefly with her and she thinks there might be some validity to this concern

## 2025-02-25 NOTE — HISTORY OF PRESENT ILLNESS
[FreeTextEntry1] : 28-year-old lady history of fibroids, hyperlipidemia, headaches, here for constipation.  can't have bm w/o meds 2-3 d w/o bm Ambida - dulcolax for several years--[id number] before this at leat every 2 days  tea - helps - gives her bad cramps  water - 8-10 botles of water a day veggies - on a diet 6 lb- intentional  both feet hurt - ? plantar fasciitis  twisted ovarian cyst - R side -  or so - [place]  dad had stomach cancer  in his late 50s-early  60s mets to brain not clear primary  no dysphagia, odynophagia no GERD heavier than usual menses last month

## 2025-05-01 NOTE — ASSESSMENT
[FreeTextEntry1] : 28-year-old lady history of fibroids, hyperlipidemia, headaches, severe bilateral plantar fasciitis here for constipation.  Noted to be H. pylori positive on recent EGD.  Completed treatment, has not done the breath test yet.  Bowel movements are better now.

## 2025-05-01 NOTE — HISTORY OF PRESENT ILLNESS
[FreeTextEntry1] : 28-year-old lady history of fibroids, hyperlipidemia, headaches, severe bilateral plantar fasciitis here for constipation. The patient reports that her father  of metastatic GI cancer with mets to the brain with an unclear primary in his late 50s. She is very concerned that the constipation she is currently experiencing might be related to this family history. She is not anemic, she is intentionally lost a little weight, but she drinks a lot of water and eats a lot of fiber and in spite of this she cannot have bowel movements without laxative. In terms of upper symptoms, she does note that she gets short of breath if lying down postprandially which might be related to just having a full stomach. Plan at last office visit 2025:  1. Sitz marker study to evaluate for constipation 2. EGD and colonoscopy to evaluate for family history of GI neoplasm of unclear primary. The procedure(s) was (were) discussed in detail with the patient, including indications, alternatives and limitations. The risks and benefits were reviewed. If applicable, the prep directions were reviewed as well, else the patient was told to fast on the day of the procedure.  3. Would suggest anorectal manometry if the colonoscopy is nondiagnostic. 4. I am also concerned that the patient has PTSD (her father was not okay with her sexuality) and is literally retaining stress in her body in the form of plantar fasciitis and constipation; I discussed this briefly with her and she thinks there might be some validity to this concern      Updates: Sitz marker study not done  EGD and colonoscopy 2025: Normal  1  Duodenum, biopsy second part 2  Duodenum, bulb 3  Stomach biopsy random  Final Diagnosis 1 and 2.  Duodenum, second portion and bulb; biopsies: -   Duodenal mucosa without significant pathology   3.  Stomach; biopsy: -   Chronic active gastritis, marked with lymphoid follicle -   Helicobacter-like organisms are identified with Diff-Quik stain  Quadruple therapy sent to pharmacy.  Difficult to tolerate, had to eat actually more frequently to avoid nausea.  Gained 4 pounds, is working on losing them now.  Bowel movements more regular, during the period of the endoscopy up to 3 times a day but even subsequently (been about 2 weeks), going daily with no difficulty.

## 2025-05-15 NOTE — HISTORY OF PRESENT ILLNESS
[FreeTextEntry1] : The patient is here for headaches which started a few months ago. The headaches are described as pressure as well as sharp pain, associated with nausea and vomiting. The patient's headache occurs several times per month and lasts for few minutes, up to an hour, and that she takes the medication, they resolved with Excedrin. There is no photophobia or phonophobia with the headaches. She has occasional dizziness, not always associated with the headaches.  The headaches occur 10-20 times per month. The patient is sexually active, she does not use contraception, she is damon. No plans on pregnancy at this time.  Started on riboflavin 400 mg, magnesium 400 mg and coenzyme Q 200 mg daily with improvement of headache to 4 headaches per month.  She stopped the meds.  Saw neurosurgery.  Also referred for bl Tarsal tunner eval for bl heel pain, sharp pain, without weakness.  Patient has back pain.

## 2025-05-15 NOTE — ASSESSMENT
[FreeTextEntry1] : The patient is having headaches, with tension and sharp types of pain with some associated migrainous features, without meeting criteria for migraines. This headaches in an increased frequency and sometimes wake up patient from sleep. Given this, we will plan to obtain MRI of the brain to rule out secondary causes of headaches. For headache prevention, will start riboflavin 400 mg, magnesium 400 mg and coenzyme Q 200 mg daily. For headache rescue, the patient can continue with Excedrin for headache at onset of headache and repeat in 2 hours if headache does not resolve, maximum 3 times per day. The patient was previously given sumatriptan, she could continue the sumatriptan if her headaches do not improve with Excedrin.  *** QTc interval is 398. No plans for pregnancy at this point. The patient does not use contraception, she is same-sex relationship/s..  pain in bl heels, sharp, concern for bl Tarsal tunnel, ncs/emg legs shows chronic L5 radic bl GIven pt has h/o back symptoms cw Ls radiculopathy will get MRI LS spine and refer pt to PT  will consider adding Nortriptyline 10mg HS for headaches and and lumbar radic at next visit if patient continues to have the symptoms  I spent the time noted on the day of this patient encounter preparing for, providing and documenting the above E/M service and counseling and educate patient on differential, workup, disease course, and treatment/management. Education was provided to the patient during this encounter. All questions and concerns were answered and addressed in detail. The patient verbalized understanding and agreed to plan. Patient was advised to continue to monitor for neurologic symptoms and to notify my office or go to the nearest emergency room if there are any changes. Any orders/referrals and communications were provided as well.  Side effects of the above medications were discussed in detail including but not limited to applicable black box warning and teratogenicity as appropriate. For patients with seizures, I discussed risk of SUDEP with patient and advised that SUDEP risk can be minimized with good seizure control through medication compliance and other measures. Patient was advised to bring previous records to my office, including CD of imaging, when applicable.

## 2025-05-19 NOTE — HISTORY OF PRESENT ILLNESS
[FreeTextEntry1] : annual visit & preemployment  [de-identified] : 29 y/o F presents today for an annual wellness visit. Patient does not recall last annual physical exam. Reports taking no medications. Also is here for paperwork and blood work to be completed as part of a preemployment assessment. Patient denies acute complaints and reports no headache, chest pain, SOB, nausea/vomiting, diarrhea/constipation or any urinary symptoms.

## 2025-05-19 NOTE — ASSESSMENT
[Vaccines Reviewed] : Immunizations reviewed today. Please see immunization details in the vaccine log within the immunization flowsheet.  [FreeTextEntry1] : 29 y/o F, presented for an annual visit and & preemployment assessment.

## 2025-05-19 NOTE — PHYSICAL EXAM
[No Acute Distress] : no acute distress [Well-Appearing] : well-appearing [Well Developed] : well developed [Well Nourished] : well nourished [Normal Sclera/Conjunctiva] : normal sclera/conjunctiva [EOMI] : extraocular movements intact [Conjunctiva] : the conjunctiva were normal in both eyes [PERRL] : pupils were equal in size, round, and reactive to light [EOM Intact] : extraocular movements were intact [Normal Outer Ear/Nose] : the outer ears and nose were normal in appearance [Normal Oropharynx] : the oropharynx was normal [No JVD] : no jugular venous distention [No Lymphadenopathy] : no lymphadenopathy [Supple] : supple [Thyroid Normal, No Nodules] : the thyroid was normal and there were no nodules present [Normal Appearance] : was normal in appearance [Neck Supple] : was supple [No Respiratory Distress] : no respiratory distress  [No Accessory Muscle Use] : no accessory muscle use [Clear to Auscultation] : lungs were clear to auscultation bilaterally [Rate ___] : at [unfilled] breaths per minute [Normal Rhythm/Effort] : normal respiratory rhythm and effort [Clear Bilaterally] : the lungs were clear to auscultation bilaterally [Normal to Percussion] : the lungs were normal to percussion [Regular Rhythm] : with a regular rhythm [Normal S1, S2] : normal S1 and S2 [No Carotid Bruits] : no carotid bruits [No Abdominal Bruit] : a ~M bruit was not heard ~T in the abdomen [No Varicosities] : no varicosities [Pedal Pulses Present] : the pedal pulses are present [No Edema] : there was no peripheral edema [No Palpable Aorta] : no palpable aorta [No Extremity Clubbing/Cyanosis] : no extremity clubbing/cyanosis [Normal Rate] : normal [Heart Rate ___] : [unfilled] bpm [Normal S1] : normal S1 [Normal S2] : normal S2 [No Murmur] : no murmurs heard [No Pitting Edema] : no pitting edema present [2+] : left 2+ [No Abnormalities] : the abdominal aorta was not enlarged and no bruit was heard [Declined Breast Exam] : declined breast exam  [Soft] : abdomen soft [Non Tender] : non-tender [Non-distended] : non-distended [Normal Bowel Sounds] : normal bowel sounds [Soft, Nontender] : the abdomen was soft and nontender [No Mass] : no masses were palpated [No HSM] : no hepatosplenomegaly noted [Normal Posterior Cervical Nodes] : no posterior cervical lymphadenopathy [Normal Anterior Cervical Nodes] : no anterior cervical lymphadenopathy [No CVA Tenderness] : no CVA  tenderness [No Spinal Tenderness] : no spinal tenderness [Normal Kyphosis] : normal kyphosis [No Visual Abnormalities] : no visible abnormalities [Normal Lordosis] : normal lordosis [No Scoliosis] : no scoliosis [No Tenderness to Palpation] : no spine tenderness on palpation [No Masses] : no masses [Full ROM] : full ROM [No Pain with ROM] : no pain with motion in any direction [Intact] : all reflexes within normal limits bilaterally [No Joint Swelling] : no joint swelling [Grossly Normal Strength/Tone] : grossly normal strength/tone [Normal Station and Gait] : the gait and station were normal [Normal Motor Tone] : the muscle tone was normal [Involuntary Movements] : no involuntary movements were seen [No Rash] : no rash [Coordination Grossly Intact] : coordination grossly intact [No Focal Deficits] : no focal deficits [Normal Gait] : normal gait [Deep Tendon Reflexes (DTR)] : deep tendon reflexes were 2+ and symmetric [Normal] : the deep tendon reflexes were normal [Normal Affect] : the affect was normal [Normal Insight/Judgement] : insight and judgment were intact [Normal Mental Status] : the patient's orientation, memory, attention, language and fund of knowledge were normal [Appropriate] : appropriate [Enlarged Diffusely] : was not enlarged [S3] : no S3 [S4] : no S4 [Right Carotid Bruit] : no bruit heard over the right carotid [Left Carotid Bruit] : no bruit heard over the left carotid [Right Femoral Bruit] : no bruit heard over the right femoral artery [Left Femoral Bruit] : no bruit heard over the left femoral artery [Cervical Lymph Nodes Enlarged Posterior Bilaterally] : nodes not enlarged [Supraclavicular Lymph Nodes Enlarged Bilaterally] : nodes not enlarged [Axillary Lymph Nodes Enlarged Bilaterally] : nodes not enlarged [Inguinal Lymph Nodes Enlarged Bilaterally] : nodes not enlarged [Abnormal Color] : normal color and pigmentation [Skin Lesions 1] : no skin lesions were observed [Skin Turgor Decreased] : normal skin turgor [Impaired judgment] : intact judgment [Impaired Insight] : intact insight [de-identified] : normal tongue, good dentition [de-identified] : defer to GYN [de-identified] : defer to GYN

## 2025-05-19 NOTE — HEALTH RISK ASSESSMENT
[1 or 2 (0 pts)] : 1 or 2 (0 points) [Never (0 pts)] : Never (0 points) [No falls in past year] : Patient reported no falls in the past year [0] : 2) Feeling down, depressed, or hopeless: Not at all (0) [Never] : Never [Alone] : lives alone [Employed] : employed [Single] : single [Sexually Active] : sexually active [Feels Safe at Home] : Feels safe at home [Reports normal functional visual acuity (ie: able to read med bottle)] : Reports normal functional visual acuity [Little interest or pleasure doing things] : 1) Little interest or pleasure doing things [Feeling down, depressed, or hopeless] : 2) Feeling down, depressed, or hopeless [PHQ-2 Negative - No further assessment needed] : PHQ-2 Negative - No further assessment needed [None] : Patient does not have any barriers to medication adherence [Yes] : Reviewed medication list for presence of high-risk medications. 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

## 2025-05-19 NOTE — END OF VISIT
[] : Resident [FreeTextEntry3] : I, Dr. Kishore Freeman, saw and evaluated this patient in the presence of the resident. I discussed the management with the resident. I reviewed the note and agreed with the documented plan of care with the following confirmation/ corrections/ additions: None. [Time Spent: ___ minutes] : I have spent [unfilled] minutes of time on the encounter which excludes teaching and separately reported services.

## 2025-05-19 NOTE — COUNSELING
[Fall prevention counseling provided] : Fall prevention counseling provided [Adequate lighting] : Adequate lighting [No throw rugs] : No throw rugs [Use proper foot wear] : Use proper foot wear [Use recommended devices] : Use recommended devices [Weigh Self Weekly] : weigh self weekly [Decrease Portions] : decrease portions [____ min/wk Activity] : [unfilled] min/wk activity [Keep Food Diary] : keep food diary

## 2025-05-19 NOTE — PLAN
[FreeTextEntry1] : #Annual physical - PHQ-2 score: 0 - obtain CBC, CMP, hba1c, lipid panel, UA - denies tobacco use, alcohol use or illicit drug use - last PAP smear 10/2024, unremarkable  #Preemployment assessment - send vaccination titers including measles, mumps and rubella - send QuantiFERON for TB - obtain drug toxicology screening   #Chronic Headaches - currently controlled - prev. seen by Neurology - reassurance  F/U in 6 months

## 2025-07-17 NOTE — PHYSICAL EXAM
[No Acute Distress] : no acute distress [Well Nourished] : well nourished [Well Developed] : well developed [Well-Appearing] : well-appearing [Normal Sclera/Conjunctiva] : normal sclera/conjunctiva [PERRL] : pupils equal round and reactive to light [EOMI] : extraocular movements intact [Normal Outer Ear/Nose] : the outer ears and nose were normal in appearance [Normal Oropharynx] : the oropharynx was normal [No JVD] : no jugular venous distention [No Lymphadenopathy] : no lymphadenopathy [Supple] : supple [Thyroid Normal, No Nodules] : the thyroid was normal and there were no nodules present [No Respiratory Distress] : no respiratory distress  [No Accessory Muscle Use] : no accessory muscle use [Clear to Auscultation] : lungs were clear to auscultation bilaterally [Normal Rate] : normal rate  [Regular Rhythm] : with a regular rhythm [Normal S1, S2] : normal S1 and S2 [No Murmur] : no murmur heard [No Carotid Bruits] : no carotid bruits [No Abdominal Bruit] : a ~M bruit was not heard ~T in the abdomen [No Varicosities] : no varicosities [Pedal Pulses Present] : the pedal pulses are present [No Edema] : there was no peripheral edema [No Palpable Aorta] : no palpable aorta [No Extremity Clubbing/Cyanosis] : no extremity clubbing/cyanosis [Soft] : abdomen soft [Non-distended] : non-distended [No Masses] : no abdominal mass palpated [No HSM] : no HSM [Normal Bowel Sounds] : normal bowel sounds [Normal Posterior Cervical Nodes] : no posterior cervical lymphadenopathy [Normal Anterior Cervical Nodes] : no anterior cervical lymphadenopathy [No CVA Tenderness] : no CVA  tenderness [No Spinal Tenderness] : no spinal tenderness [No Joint Swelling] : no joint swelling [Grossly Normal Strength/Tone] : grossly normal strength/tone [No Rash] : no rash [Coordination Grossly Intact] : coordination grossly intact [No Focal Deficits] : no focal deficits [Normal Gait] : normal gait [Deep Tendon Reflexes (DTR)] : deep tendon reflexes were 2+ and symmetric [Normal Affect] : the affect was normal [Normal Insight/Judgement] : insight and judgment were intact [de-identified] : (+) periumbilical tenderness

## 2025-07-17 NOTE — HISTORY OF PRESENT ILLNESS
[FreeTextEntry8] : Patient is a 27 y/o F w/ PMHx of HLD, Vit. D deficiency, endometrial polyp, uterine fibroids who came here due to abdominal pain. She has been experiencing periumbilical pain for the past few months. She has seen GYN prior for her fibroids. She denies any headache, dizziness, nausea, vomiting, cough, fever, chest pain, shortness of breath, palpitation, heartburn, diarrhea, constipation, dysuria, hematuria, back pain, joint pain, weight loss, loss of appetite